# Patient Record
Sex: FEMALE | Race: WHITE | NOT HISPANIC OR LATINO | URBAN - METROPOLITAN AREA
[De-identification: names, ages, dates, MRNs, and addresses within clinical notes are randomized per-mention and may not be internally consistent; named-entity substitution may affect disease eponyms.]

---
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
## 2022-01-21 ENCOUNTER — TELEPHONE (OUTPATIENT)
Dept: OBSTETRICS AND GYNECOLOGY | Facility: CLINIC | Age: 22
End: 2022-01-21

## 2022-01-21 NOTE — TELEPHONE ENCOUNTER
Pt called is schedule for new pt appt on 2/1/22. She will be out of her birth control tomorrow and was wondering if she could get a refill until her appt.

## 2022-01-21 NOTE — TELEPHONE ENCOUNTER
Need to confirm what birth control she is taking and the pharmacy. Attempted to contact patient but the number listed does not ring.

## 2022-02-01 ENCOUNTER — OFFICE VISIT (OUTPATIENT)
Dept: OBSTETRICS AND GYNECOLOGY | Facility: CLINIC | Age: 22
End: 2022-02-01

## 2022-02-01 VITALS
WEIGHT: 143.2 LBS | DIASTOLIC BLOOD PRESSURE: 60 MMHG | SYSTOLIC BLOOD PRESSURE: 108 MMHG | HEIGHT: 69 IN | BODY MASS INDEX: 21.21 KG/M2

## 2022-02-01 DIAGNOSIS — Z30.016 ENCOUNTER FOR INITIAL PRESCRIPTION OF TRANSDERMAL PATCH HORMONAL CONTRACEPTIVE DEVICE: ICD-10-CM

## 2022-02-01 DIAGNOSIS — N92.1 BREAKTHROUGH BLEEDING ON BIRTH CONTROL PILLS: Primary | ICD-10-CM

## 2022-02-01 DIAGNOSIS — Z30.09 BIRTH CONTROL COUNSELING: ICD-10-CM

## 2022-02-01 PROCEDURE — 99213 OFFICE O/P EST LOW 20 MIN: CPT | Performed by: NURSE PRACTITIONER

## 2022-02-01 RX ORDER — AMOXICILLIN 500 MG/1
500 CAPSULE ORAL 2 TIMES DAILY
COMMUNITY
Start: 2022-01-28

## 2022-02-01 NOTE — PROGRESS NOTES
Chief Complaint   Patient presents with   • New gyn   • Menstrual Problem       Subjective   HPI  Marie Naidu is a 21 y.o. female, , who presents for irregular menses.      She states she has experienced this problem for 8 months.  She has been taking an OCP for about a year.  She admits to trying 2-3 different types of OCPs, after stopping depo about 3 years ago.  She was using depo x 1 1/2 years.  Now, she states that periods are occurring during the second week of her active pills and during the week of the placebo pills.  During active pills it will last x 3-4 days, light in flow, without cramping.  During placebo pills, it is lasting 3-4 days as well, light in flow, without cramping.      She tried to d/c OCP to see if it would help to alleviate recent diagnosed depression, but began having acne.  She restarted pill, and irregular periods returned.       Partner Status: Marital Status: single.  New Partners since her last STD screening: yes.  Desires STD Screening: no.    Additional OB/GYN History   Current contraception: contraceptive methods: OCP (estrogen/progesterone)  Desires to: Discuss other options for contraception  Last Pap : N/A  Last Completed Pap Smear     This patient has no relevant Health Maintenance data.        History of abnormal Pap smear: no  Tobacco Usage?: No   OB History        0    Para   0    Term   0       0    AB   0    Living   0       SAB   0    IAB   0    Ectopic   0    Molar   0    Multiple   0    Live Births   0                Health Maintenance   Topic Date Due   • ANNUAL PHYSICAL  Never done   • HPV VACCINES (1 - 2-dose series) Never done   • INFLUENZA VACCINE  2021   • COVID-19 Vaccine (3 - Booster for Pfizer series) 2021   • HEPATITIS C SCREENING  Never done   • TDAP/TD VACCINES (2 - Td or Tdap) 2023   • MENINGOCOCCAL VACCINE  Completed   • Pneumococcal Vaccine 0-64  Aged Out       The additional following portions of the patient's  "history were reviewed and updated as appropriate: allergies, current medications, past family history, past medical history, past social history, past surgical history and problem list.    Review of Systems   Genitourinary: Positive for menstrual problem (BTB on OCPs).   All other systems reviewed and are negative.      I have reviewed and agree with the HPI, ROS, and historical information as entered above. Farheen Ko, APRN    Objective   /60   Ht 174 cm (68.5\")   Wt 65 kg (143 lb 3.2 oz)   LMP 01/17/2022 (Approximate)   Breastfeeding No   BMI 21.46 kg/m²     Physical Exam  Vitals and nursing note reviewed.   Constitutional:       Appearance: Normal appearance.   HENT:      Head: Normocephalic and atraumatic.   Pulmonary:      Effort: Pulmonary effort is normal.   Neurological:      Mental Status: She is alert and oriented to person, place, and time.   Psychiatric:         Behavior: Behavior normal.     Declined pelvic exam     Assessment/Plan     Assessment     Problem List Items Addressed This Visit     None      Visit Diagnoses     Breakthrough bleeding on birth control pills    -  Primary    Relevant Orders    CBC (No Diff)    TSH    HCG, B-subunit, Quantitative    Chlamydia trachomatis, Neisseria gonorrhoeae, PCR - , Urine, Clean Catch    Encounter for initial prescription of transdermal patch hormonal contraceptive device        Birth control counseling              Plan     1. Check labs and STD screening. Pt declined pelvic exam. We discussed other options for birth control, and pt desires to try the BC patch. Discussed that similar to OCP's, however there is a slightly higher risk of DVT. PVU. We reviewed how to use, side effects and risks. If BTB continues, consider pelvic U/S. Follow up in 3 months.       JOSE Hammer  02/01/2022  "

## 2022-02-03 LAB
C TRACH RRNA SPEC QL NAA+PROBE: NEGATIVE
ERYTHROCYTE [DISTWIDTH] IN BLOOD BY AUTOMATED COUNT: 12.9 % (ref 12.3–15.4)
HCG INTACT+B SERPL-ACNC: <0.5 MIU/ML
HCT VFR BLD AUTO: 36.5 % (ref 34–46.6)
HGB BLD-MCNC: 11.7 G/DL (ref 12–15.9)
MCH RBC QN AUTO: 26.5 PG (ref 26.6–33)
MCHC RBC AUTO-ENTMCNC: 32.1 G/DL (ref 31.5–35.7)
MCV RBC AUTO: 82.8 FL (ref 79–97)
N GONORRHOEA RRNA SPEC QL NAA+PROBE: NEGATIVE
PLATELET # BLD AUTO: 251 10*3/MM3 (ref 140–450)
RBC # BLD AUTO: 4.41 10*6/MM3 (ref 3.77–5.28)
TSH SERPL DL<=0.005 MIU/L-ACNC: 1.68 UIU/ML (ref 0.27–4.2)
WBC # BLD AUTO: 7.04 10*3/MM3 (ref 3.4–10.8)

## 2022-03-08 RX ORDER — NORELGESTROMIN AND ETHINYL ESTRADIOL 150; 35 UG/D; UG/D
PATCH TRANSDERMAL
Qty: 3 PATCH | Refills: 5 | Status: SHIPPED | OUTPATIENT
Start: 2022-03-08

## 2022-05-03 ENCOUNTER — OFFICE VISIT (OUTPATIENT)
Dept: OBSTETRICS AND GYNECOLOGY | Facility: CLINIC | Age: 22
End: 2022-05-03

## 2022-05-03 VITALS — WEIGHT: 151.8 LBS | BODY MASS INDEX: 22.75 KG/M2 | SYSTOLIC BLOOD PRESSURE: 98 MMHG | DIASTOLIC BLOOD PRESSURE: 60 MMHG

## 2022-05-03 DIAGNOSIS — Z30.45 ENCOUNTER FOR SURVEILLANCE OF TRANSDERMAL PATCH HORMONAL CONTRACEPTIVE DEVICE: Primary | ICD-10-CM

## 2022-05-03 PROCEDURE — 99212 OFFICE O/P EST SF 10 MIN: CPT | Performed by: NURSE PRACTITIONER
